# Patient Record
Sex: FEMALE | Race: ASIAN
[De-identification: names, ages, dates, MRNs, and addresses within clinical notes are randomized per-mention and may not be internally consistent; named-entity substitution may affect disease eponyms.]

---

## 2018-04-30 PROBLEM — Z00.00 ENCOUNTER FOR PREVENTIVE HEALTH EXAMINATION: Status: ACTIVE | Noted: 2018-04-30

## 2018-05-01 ENCOUNTER — APPOINTMENT (OUTPATIENT)
Dept: SURGERY | Facility: CLINIC | Age: 28
End: 2018-05-01
Payer: MEDICAID

## 2018-05-01 VITALS
HEART RATE: 84 BPM | TEMPERATURE: 98 F | DIASTOLIC BLOOD PRESSURE: 77 MMHG | HEIGHT: 64.1 IN | OXYGEN SATURATION: 100 % | WEIGHT: 137 LBS | RESPIRATION RATE: 18 BRPM | BODY MASS INDEX: 23.39 KG/M2 | SYSTOLIC BLOOD PRESSURE: 113 MMHG

## 2018-05-01 DIAGNOSIS — Z78.9 OTHER SPECIFIED HEALTH STATUS: ICD-10-CM

## 2018-05-01 PROCEDURE — 99202 OFFICE O/P NEW SF 15 MIN: CPT

## 2018-12-21 ENCOUNTER — APPOINTMENT (OUTPATIENT)
Dept: PLASTIC SURGERY | Facility: CLINIC | Age: 28
End: 2018-12-21

## 2019-01-04 ENCOUNTER — APPOINTMENT (OUTPATIENT)
Dept: PLASTIC SURGERY | Facility: CLINIC | Age: 29
End: 2019-01-04
Payer: MEDICAID

## 2019-01-04 VITALS — HEIGHT: 64 IN | BODY MASS INDEX: 23.05 KG/M2 | WEIGHT: 135 LBS

## 2019-01-04 DIAGNOSIS — Z72.3 LACK OF PHYSICAL EXERCISE: ICD-10-CM

## 2019-01-04 DIAGNOSIS — E78.00 PURE HYPERCHOLESTEROLEMIA, UNSPECIFIED: ICD-10-CM

## 2019-01-04 PROCEDURE — 99204 OFFICE O/P NEW MOD 45 MIN: CPT

## 2019-01-07 PROBLEM — E78.00 HIGH CHOLESTEROL: Status: ACTIVE | Noted: 2019-01-04

## 2019-01-07 PROBLEM — Z72.3 DOES NOT EXERCISE: Status: ACTIVE | Noted: 2019-01-04

## 2019-02-08 ENCOUNTER — LABORATORY RESULT (OUTPATIENT)
Age: 29
End: 2019-02-08

## 2019-02-08 ENCOUNTER — APPOINTMENT (OUTPATIENT)
Dept: PLASTIC SURGERY | Facility: CLINIC | Age: 29
End: 2019-02-08
Payer: MEDICAID

## 2019-02-08 PROCEDURE — ZZZZZ: CPT

## 2019-02-08 RX ORDER — OXYCODONE AND ACETAMINOPHEN 5; 325 MG/1; MG/1
5-325 TABLET ORAL
Qty: 20 | Refills: 0 | Status: ACTIVE | COMMUNITY
Start: 2019-02-08 | End: 1900-01-01

## 2019-02-08 NOTE — REASON FOR VISIT
[Procedure: _________] : a [unfilled] procedure visit [Spouse] : spouse [FreeTextEntry1] : Excisional biopsy and closure of Right flank mass. Pt feels well for procedure.

## 2019-02-15 ENCOUNTER — APPOINTMENT (OUTPATIENT)
Dept: PLASTIC SURGERY | Facility: CLINIC | Age: 29
End: 2019-02-15
Payer: MEDICAID

## 2019-02-15 DIAGNOSIS — D17.1 BENIGN LIPOMATOUS NEOPLASM OF SKIN AND SUBCUTANEOUS TISSUE OF TRUNK: ICD-10-CM

## 2019-02-15 PROCEDURE — 99024 POSTOP FOLLOW-UP VISIT: CPT

## 2019-02-15 NOTE — ASSESSMENT
[FreeTextEntry1] : \par \par 02/08/19 Flank mass -\par Mature adipose tissue compatible with lipoma\par \par - Pathology reviewed w/ patient\par - Steri strip replaced\par - The patient was encouraged to massage the incision site 2-3 times per day for 8-10 minutes with lotion, vitamin E, or cocoa butter to encourage blood flow and to decrease scar tissue formation.\par - Follow up PRN.

## 2019-02-15 NOTE — PHYSICAL EXAM
[de-identified] : Right Flank -\par Incisions are CDI, irritation from tegederm noted, no erythema, no gaps, no drainage noted. No sign of active infection.

## 2019-02-15 NOTE — ADDENDUM
[FreeTextEntry1] : All medical record entries made were at my, ESTELA GILBERT MD, direction and personally dictated by me. I have reviewed the chart and agree that the record accurately reflects my personal performance of the history, physical exam, assessment, and plan.

## 2019-02-15 NOTE — HISTORY OF PRESENT ILLNESS
[FreeTextEntry1] : 27 yo F who presents for post op visit s/p excisional biopsy of right flank mass DOP: 02/08/19. Pt reports itching and bruising at the site. Pain is controlled with Motrin. She states that the dressings was very itchy. Otherwise, no other complaints or concerns; denies, fever, sweats, or chills.